# Patient Record
Sex: FEMALE | Race: WHITE | NOT HISPANIC OR LATINO | Employment: UNEMPLOYED | URBAN - METROPOLITAN AREA
[De-identification: names, ages, dates, MRNs, and addresses within clinical notes are randomized per-mention and may not be internally consistent; named-entity substitution may affect disease eponyms.]

---

## 2022-04-09 ENCOUNTER — HISTORICAL (OUTPATIENT)
Dept: ADMINISTRATIVE | Facility: HOSPITAL | Age: 52
End: 2022-04-09

## 2022-04-27 VITALS
WEIGHT: 167.44 LBS | DIASTOLIC BLOOD PRESSURE: 53 MMHG | BODY MASS INDEX: 26.91 KG/M2 | SYSTOLIC BLOOD PRESSURE: 110 MMHG | HEIGHT: 66 IN

## 2022-08-18 LAB
PAP RECOMMENDATION EXT: NORMAL
PAP SMEAR: NORMAL

## 2022-10-07 LAB — BCS RECOMMENDATION EXT: NORMAL

## 2023-01-18 ENCOUNTER — DOCUMENTATION ONLY (OUTPATIENT)
Dept: ADMINISTRATIVE | Facility: HOSPITAL | Age: 53
End: 2023-01-18

## 2024-01-09 ENCOUNTER — OFFICE VISIT (OUTPATIENT)
Dept: OBSTETRICS AND GYNECOLOGY | Facility: CLINIC | Age: 54
End: 2024-01-09
Payer: COMMERCIAL

## 2024-01-09 VITALS
WEIGHT: 172.63 LBS | DIASTOLIC BLOOD PRESSURE: 82 MMHG | HEART RATE: 64 BPM | SYSTOLIC BLOOD PRESSURE: 120 MMHG | BODY MASS INDEX: 27.74 KG/M2 | HEIGHT: 66 IN

## 2024-01-09 DIAGNOSIS — R82.90 ABNORMAL URINE ODOR: ICD-10-CM

## 2024-01-09 DIAGNOSIS — Z12.31 SCREENING MAMMOGRAM FOR BREAST CANCER: Primary | ICD-10-CM

## 2024-01-09 DIAGNOSIS — Z01.419 ENCOUNTER FOR ANNUAL ROUTINE GYNECOLOGICAL EXAMINATION: ICD-10-CM

## 2024-01-09 LAB
BILIRUB UR QL STRIP: NEGATIVE
GLUCOSE UR QL STRIP: NEGATIVE
KETONES UR QL STRIP: NEGATIVE
LEUKOCYTE ESTERASE UR QL STRIP: NEGATIVE
PH, POC UA: 7
POC BLOOD, URINE: NEGATIVE
POC NITRATES, URINE: NEGATIVE
PROT UR QL STRIP: POSITIVE
SP GR UR STRIP: 1.02 (ref 1–1.03)
UROBILINOGEN UR STRIP-ACNC: 1 (ref 0.1–1.1)

## 2024-01-09 PROCEDURE — 81003 URINALYSIS AUTO W/O SCOPE: CPT | Mod: QW,,, | Performed by: OBSTETRICS & GYNECOLOGY

## 2024-01-09 PROCEDURE — 99396 PREV VISIT EST AGE 40-64: CPT | Mod: 25,,, | Performed by: OBSTETRICS & GYNECOLOGY

## 2024-01-09 PROCEDURE — 3079F DIAST BP 80-89 MM HG: CPT | Mod: CPTII,,, | Performed by: OBSTETRICS & GYNECOLOGY

## 2024-01-09 PROCEDURE — 1159F MED LIST DOCD IN RCRD: CPT | Mod: CPTII,,, | Performed by: OBSTETRICS & GYNECOLOGY

## 2024-01-09 PROCEDURE — 3008F BODY MASS INDEX DOCD: CPT | Mod: CPTII,,, | Performed by: OBSTETRICS & GYNECOLOGY

## 2024-01-09 PROCEDURE — 1160F RVW MEDS BY RX/DR IN RCRD: CPT | Mod: CPTII,,, | Performed by: OBSTETRICS & GYNECOLOGY

## 2024-01-09 PROCEDURE — 3074F SYST BP LT 130 MM HG: CPT | Mod: CPTII,,, | Performed by: OBSTETRICS & GYNECOLOGY

## 2024-01-09 RX ORDER — DILTIAZEM HYDROCHLORIDE 180 MG/1
180 CAPSULE, COATED, EXTENDED RELEASE ORAL
COMMUNITY
Start: 2023-11-15

## 2024-01-09 RX ORDER — NEBIVOLOL 10 MG/1
10 TABLET ORAL
COMMUNITY
Start: 2023-11-15

## 2024-01-09 RX ORDER — LIFITEGRAST 50 MG/ML
SOLUTION/ DROPS OPHTHALMIC
COMMUNITY
Start: 2023-09-27

## 2024-01-09 NOTE — PROGRESS NOTES
Patient ID: [id number]   Chief Complaint: Annual exam  Chief Complaint   Patient presents with    Annual Exam     C/O STRONG ODOR TO URINE . DENIES DYSURIA.     HPI:   Pretty Kendall is a 53 y.o. year old  here for her Annual Exam.   Patient's last menstrual period was 2023 (approximate).    C/O STRONG ODOR TO URINE. DENIES DYSURIA. DENIES BACK PAIN.    Subjective:     Past Medical History:   Diagnosis Date    Abnormal Pap smear of cervix     Dry eyes     GERD (gastroesophageal reflux disease)     Mitral valve prolapse     Raynaud phenomenon      Past Surgical History:   Procedure Laterality Date    CRYO OF CERVIX      TONSILS REMOVED      WISDOM TEETH EXTRACTED       Social History     Tobacco Use    Smoking status: Never    Smokeless tobacco: Never   Substance Use Topics    Alcohol use: Yes     Comment: RARE    Drug use: Never     Family History   Problem Relation Age of Onset    Heart disease Paternal Grandfather     Heart disease Paternal Grandmother     Heart disease Maternal Grandmother     Heart disease Maternal Grandfather     Cancer Father     Heart disease Father     Heart disease Mother      OB History    Para Term  AB Living   2 2 1 1   2   SAB IAB Ectopic Multiple Live Births           2      # Outcome Date GA Lbr Christiano/2nd Weight Sex Delivery Anes PTL Lv   2  01    F Vag-Spont   COLEMAN   1 Term 97    F Vag-Spont   COLEMAN       Current Outpatient Medications:     diltiaZEM (CARDIZEM CD) 180 MG 24 hr capsule, Take 180 mg by mouth., Disp: , Rfl:     nebivoloL (BYSTOLIC) 10 MG Tab, Take 10 mg by mouth., Disp: , Rfl:     XIIDRA 5 % Dpet, , Disp: , Rfl:     MENARCHEAL:  Cycle Length: 5 days   Flow: normal  Dysmenorrhea: YES  If yes: Mild  Intermenstrual Bleeding: No  PAP:  Last PAP: 2024    History of Abnormal PAP Smear: YES  Treated: Colposcopy CRYO SURG  HPV Vaccine: NO  INTERCOURSE:  Dyspareunia: No  Postcoital Bleeding: No  History of STI: No  Current Birth Control  "Method: vasectomy  Sexually Active: yes  BREAST HISTORY:   Last Mammogram: 10-7-22  History of Abnormal Mammogram: YES: DENSITY     COLONOSCOPY:  Last Colonoscopy:3-2023 POLYP BENIGN       Review of Systems 12 point review of systems conducted, negative except as stated in the history of present illness. See HPI for details.    Objective:   Visit Vitals  /82   Pulse 64   Ht 5' 6" (1.676 m)   Wt 78.3 kg (172 lb 9.6 oz)   LMP 12/28/2023 (Approximate)   BMI 27.86 kg/m²     No results found for this or any previous visit (from the past 24 hour(s)).  Physical Exam:  Physical Exam  Constitutional:  General Appearance : alert, in no acute distress, normal, well nourished.  Respiratory:  Respiratory Effort: normal.  Breast:  Right: Inspection/palpation: no discharge, no masses present, no nipple retraction, no skin changes, no skin dimpling, no tenderness, no lymphadenopathy, no axillary mass, no axillary tenderness.  Left: Inspection/palpation: no discharge, no masses present, no nipple retraction, no skin changes, no skin dimpling, no tenderness, no lymphadenopathy, no axillary mass, no axillary tenderness.  Gastrointestinal:  Abdomen: no masses. no tender, nondistended.  Liver and spleen: normal  Hernias: no hernias present, no inguinal adenopathy.  Genitourinary:  External Genitalia: normal, no lesions.  Vagina: normal appearance, no abnormal discharge, no lesions.  Bladder: no mass, nontender.  Urethra: no erythema or lesions present.  Cervix: no lesions, non tender. Pap Done DECLINES STD TEST VIA PAP  Uterus: nontender, normal contour, normal mobility, normal size.   Adnexa: no masses, no tenderness.  Anus and Perineum: visually normal.   Chaperone Present  No results found for this or any previous visit (from the past 24 hour(s)).  Assessment/Plan:   Assessment:   Screening mammogram for breast cancer  -     Mammo Digital Screening Bilat    Encounter for annual routine gynecological examination  -     " Liquid-Based Pap Smear, Screening Screening    Abnormal urine odor  -     POCT Urinalysis, Dipstick, Automated, W/O Scope      Follow up in about 1 year (around 1/9/2025) for ANNUAL EXAM. In addition to their scheduled FU, the patient has also been instructed to follow up on as needed basis. All questions were answered and the patient voiced understanding of the above issues.

## 2024-01-12 LAB — PSYCHE PATHOLOGY RESULT: NORMAL

## 2024-02-14 DIAGNOSIS — R92.2 INCONCLUSIVE MAMMOGRAM: Primary | ICD-10-CM

## 2024-03-07 ENCOUNTER — DOCUMENTATION ONLY (OUTPATIENT)
Dept: OBSTETRICS AND GYNECOLOGY | Facility: CLINIC | Age: 54
End: 2024-03-07
Payer: COMMERCIAL

## 2024-09-04 ENCOUNTER — OFFICE VISIT (OUTPATIENT)
Dept: OBSTETRICS AND GYNECOLOGY | Facility: CLINIC | Age: 54
End: 2024-09-04
Payer: COMMERCIAL

## 2024-09-04 VITALS
DIASTOLIC BLOOD PRESSURE: 74 MMHG | BODY MASS INDEX: 28.34 KG/M2 | WEIGHT: 175.63 LBS | SYSTOLIC BLOOD PRESSURE: 112 MMHG | HEART RATE: 64 BPM

## 2024-09-04 DIAGNOSIS — R55 VASOMOTOR INSTABILITY: ICD-10-CM

## 2024-09-04 DIAGNOSIS — N92.6 IRREGULAR BLEEDING: Primary | ICD-10-CM

## 2024-09-04 LAB
ERYTHROCYTE [DISTWIDTH] IN BLOOD BY AUTOMATED COUNT: 12.8 % (ref 11–14.5)
HCT VFR BLD AUTO: 42.5 % (ref 36–48)
HGB BLD-MCNC: 14.2 G/DL (ref 11.8–16)
MCH RBC QN AUTO: 30.7 PG (ref 27–34)
MCHC RBC AUTO-ENTMCNC: 33.4 G/DL (ref 31–37)
MCV RBC AUTO: 92 FL (ref 79–99)
NRBC BLD AUTO-RTO: 0 %
PLATELET # BLD AUTO: 453 X10(3)/MCL (ref 140–371)
PMV BLD AUTO: 10.3 FL (ref 9.4–12.4)
RBC # BLD AUTO: 4.62 X10(6)/MCL (ref 4–5.1)
T4 FREE SERPL-MCNC: 0.99 NG/DL (ref 0.78–2.19)
TSH SERPL-ACNC: 3.22 UIU/ML (ref 0.36–3.74)
WBC # BLD AUTO: 10.23 X10(3)/MCL (ref 4–11.5)

## 2024-09-04 PROCEDURE — 1159F MED LIST DOCD IN RCRD: CPT | Mod: CPTII,,, | Performed by: OBSTETRICS & GYNECOLOGY

## 2024-09-04 PROCEDURE — 82670 ASSAY OF TOTAL ESTRADIOL: CPT | Performed by: OBSTETRICS & GYNECOLOGY

## 2024-09-04 PROCEDURE — 3078F DIAST BP <80 MM HG: CPT | Mod: CPTII,,, | Performed by: OBSTETRICS & GYNECOLOGY

## 2024-09-04 PROCEDURE — 84443 ASSAY THYROID STIM HORMONE: CPT | Performed by: OBSTETRICS & GYNECOLOGY

## 2024-09-04 PROCEDURE — 84439 ASSAY OF FREE THYROXINE: CPT | Performed by: OBSTETRICS & GYNECOLOGY

## 2024-09-04 PROCEDURE — 3074F SYST BP LT 130 MM HG: CPT | Mod: CPTII,,, | Performed by: OBSTETRICS & GYNECOLOGY

## 2024-09-04 PROCEDURE — 3008F BODY MASS INDEX DOCD: CPT | Mod: CPTII,,, | Performed by: OBSTETRICS & GYNECOLOGY

## 2024-09-04 PROCEDURE — 85027 COMPLETE CBC AUTOMATED: CPT | Performed by: OBSTETRICS & GYNECOLOGY

## 2024-09-04 PROCEDURE — 1160F RVW MEDS BY RX/DR IN RCRD: CPT | Mod: CPTII,,, | Performed by: OBSTETRICS & GYNECOLOGY

## 2024-09-04 PROCEDURE — 83001 ASSAY OF GONADOTROPIN (FSH): CPT | Performed by: OBSTETRICS & GYNECOLOGY

## 2024-09-04 PROCEDURE — 99213 OFFICE O/P EST LOW 20 MIN: CPT | Mod: ,,, | Performed by: OBSTETRICS & GYNECOLOGY

## 2024-09-04 NOTE — PROGRESS NOTES
Patient ID: [id number]   Chief Complaint: ABNORMAL BLEEDING AND SPOTTING.   HPI:   Pretty Kendall is a 54 y.o.  here today for PROBLEMS, PRESENTS TO CLINIC FOR ABNORMAL BLEEDING AND SPOTTING. PATIENT STATES SHE HAD A FLOW FOR X 2 WEEKS, AND HAS NOW BEEN SPOTTING THIS WEEK. PATIENT DENIES PELVIC PAIN. DENIES VAGINAL DISCHARGE AND ODOR.    Patient's last menstrual period was 2024 (approximate).  Past Medical History:  has a past medical history of Abnormal Pap smear of cervix, Dry eyes, GERD (gastroesophageal reflux disease), Mitral valve prolapse, and Raynaud phenomenon.  Surgical History:  has a past surgical history that includes TONSILS REMOVED; WISDOM TEETH EXTRACTED; and CRYO OF CERVIX.  Family History: family history includes Cancer in her father; Heart disease in her father, maternal grandfather, maternal grandmother, mother, paternal grandfather, and paternal grandmother.  Social History:  reports that she has never smoked. She has never used smokeless tobacco. She reports current alcohol use. She reports that she does not use drugs.  Current Outpatient Medications   Medication Sig Dispense Refill    diltiaZEM (CARDIZEM CD) 180 MG 24 hr capsule Take 180 mg by mouth.      nebivoloL (BYSTOLIC) 10 MG Tab Take 10 mg by mouth.      XIIDRA 5 % Dpet  (Patient not taking: Reported on 2024)       No current facility-administered medications for this visit.     Patient has No Known Allergies.  MENARCHEAL:  Cycle Length: 5 days   Flow: normal  Dysmenorrhea: Yes  If yes: Mild  Intermenstrual Bleeding: Yes  PAP:  Last PAP: 2024    History of Abnormal PAP Smear: YES:      Treated: Other: CRYO OF CERVIX  HPV Vaccine: NO  INTERCOURSE:  Dyspareunia: No  Postcoital Bleeding: No  History of STI: No  Current Birth Control Method: PARTNER VASECTOMY  Sexually Active: yes  BREAST HISTORY:   Last Mammogram: 2024- US FOR CYST, NORMAL  History of Abnormal Mammogram: YES:      MENOPAUSE:  Post Menopausal  Bleeding: No  Hormone Replacement Therapy: No  COLONOSCOPY:  Last Colonoscopy:  JUNE 2023- POLYPS     No results found for this or any previous visit (from the past 24 hour(s)).    Subjective:   Review of Systems  12 point review of systems conducted, negative except as stated in the history of present illness. See HPI for details.  Objective:     Visit Vitals  /74   Pulse 64   Wt 79.7 kg (175 lb 9.6 oz)   LMP 08/06/2024 (Approximate)   BMI 28.34 kg/m²     No results found for this or any previous visit (from the past 24 hour(s)).  Physical Exam  Constitutional:  General Appearance : alert, in no acute distress, normal, well nourished.  Neck/Thyroid:  Inspection/Palpation: normal. Thyroid: normal size and shape.  Respiratory:  Respiratory Effort: normal.  Gastrointestinal:  Abdomen: no masses. no tender, nondistended.  Liver and spleen: normal  Hernias: no hernias present, no inguinal adenopathy.  Genitourinary:  External Genitalia: normal, no lesions.  Vagina: normal appearance, no abnormal discharge, no lesions.  Bladder: no mass, nontender.  Urethra: no erythema or lesions present.  Cervix: no lesions, non tender. ONE SWAB COLLECTED  Uterus: nontender, normal contour, normal mobility, normal size.   Adnexa: no masses, no tenderness.  Anus and Perineum: visually normal.   Chaperone Present  Assessment:       ICD-10-CM ICD-9-CM   1. Irregular bleeding  N92.6 626.4   2. Vasomotor instability  R55 780.2     Plan   Irregular bleeding  -     MDL Sendout Test  -     US Pelvis Complete Non OB; Future; Expected date: 09/04/2024  -     CBC Without Differential; Future; Expected date: 09/04/2024  -     TSH; Future; Expected date: 09/04/2024  -     T4, Free; Future; Expected date: 09/04/2024  -     Estradiol; Future; Expected date: 09/04/2024  -     Follicle Stimulating Hormone; Future; Expected date: 09/04/2024    Vasomotor instability    Follow up in about 2 weeks (around 9/18/2024) for RESULTS/EMB. In addition to  their scheduled follow up, the patient has also been instructed to follow up on as needed basis.     MELIA KNIGHT MD

## 2024-09-05 LAB
ESTRADIOL SERPL HS-MCNC: 242 PG/ML
FSH SERPL-ACNC: 4.44 MIU/ML

## 2024-09-18 ENCOUNTER — OFFICE VISIT (OUTPATIENT)
Dept: OBSTETRICS AND GYNECOLOGY | Facility: CLINIC | Age: 54
End: 2024-09-18
Payer: COMMERCIAL

## 2024-09-18 VITALS
WEIGHT: 177 LBS | BODY MASS INDEX: 28.57 KG/M2 | HEART RATE: 61 BPM | SYSTOLIC BLOOD PRESSURE: 90 MMHG | DIASTOLIC BLOOD PRESSURE: 60 MMHG

## 2024-09-18 DIAGNOSIS — N92.6 IRREGULAR BLEEDING: Primary | ICD-10-CM

## 2024-09-18 DIAGNOSIS — R55 VASOMOTOR INSTABILITY: ICD-10-CM

## 2024-09-18 DIAGNOSIS — D25.9 UTERINE LEIOMYOMA, UNSPECIFIED LOCATION: ICD-10-CM

## 2024-09-18 RX ORDER — CELECOXIB 200 MG/1
1 CAPSULE ORAL 2 TIMES DAILY PRN
COMMUNITY
End: 2024-09-18

## 2024-09-18 NOTE — PROGRESS NOTES
"Patient ID: [id number]   Chief Complaint: pelvic ultrasound, LAB results, POSSIBLE EMB    HPI:   Pretty Kendall is a 54 y.o.  here today for Results (Presents to clinic for pelvic ultrasound results and labs. Per dr. Brantley, possible EMB. Patient states she did premedicate for visit, and complains of bleeding today. States she has been passing lots of clots and "bleeding through everything I put on" patient states of 6th week bleeding and spotting.)    U/S: 8.9CM, ES 25MM WITH CYSTS, RIGHT OV 3CM WITH 2CM CYST, LEFT OV NL, NO FREE FLUID, SINGLE ANTERIOR SUBSEROSAL FIBROID  CBC AND THYROID STUDIES NORMAL.  FSH/ESTRADIOL ARE NORMAL     Patient's last menstrual period was 2024 (approximate).  Past Medical History:  has a past medical history of Abnormal Pap smear of cervix, Dry eyes, GERD (gastroesophageal reflux disease), Mitral valve prolapse, and Raynaud phenomenon.  Surgical History:  has a past surgical history that includes TONSILS REMOVED; WISDOM TEETH EXTRACTED; and CRYO OF CERVIX.  Family History: family history includes Cancer in her father; Heart disease in her father, maternal grandfather, maternal grandmother, mother, paternal grandfather, and paternal grandmother.  Social History:  reports that she has never smoked. She has never used smokeless tobacco. She reports current alcohol use. She reports that she does not use drugs.  Current Outpatient Medications   Medication Sig Dispense Refill    diltiaZEM (CARDIZEM CD) 180 MG 24 hr capsule Take 180 mg by mouth.      nebivoloL (BYSTOLIC) 10 MG Tab Take 10 mg by mouth.      norethindrone (AYGESTIN) 5 mg Tab Take 1 tablet (5 mg total) by mouth once daily. 30 tablet 11     No current facility-administered medications for this visit.     Patient has No Known Allergies.  MENARCHEAL:  Cycle Length: 5 days   Flow: normal  Dysmenorrhea: Yes  If yes: Mild  Intermenstrual Bleeding: Yes  PAP:  Last PAP: 2024    History of Abnormal PAP Smear: YES:    "   Treated: cryo of cervix  HPV Vaccine: NO  INTERCOURSE:  Dyspareunia: No  Postcoital Bleeding: No  History of STI: No  Current Birth Control Method: none  Sexually Active: yes  BREAST HISTORY:   Last Mammogram:  JANUARY 2024- US FOR CYST, NORMAL   History of Abnormal Mammogram: NO  MENOPAUSE:  Post Menopausal Bleeding: No  Hormone Replacement Therapy: No  COLONOSCOPY:  Last Colonoscopy: JUNE 2023- POLYPS          No results found for this or any previous visit (from the past 24 hours).    Subjective:   Review of Systems  12 point review of systems conducted, negative except as stated in the history of present illness. See HPI for details.  Objective:     Visit Vitals  BP 90/60   Pulse 61   Wt 80.3 kg (177 lb)   LMP 08/06/2024 (Approximate)   BMI 28.57 kg/m²     No results found for this or any previous visit (from the past 24 hours).  Physical Exam  Constitutional:  General Appearance : alert, in no acute distress, normal, well nourished.  Neck/Thyroid:  Inspection/Palpation: normal.  Respiratory:  Respiratory Effort: normal.  Gastrointestinal:  Abdomen: no masses. no tender, nondistended.  Liver and spleen: normal  Hernias: no hernias present, no inguinal adenopathy.  Genitourinary:  External Genitalia: normal, no lesions.  Vagina: normal appearance, no abnormal discharge, no lesions.  Bladder: no mass, nontender.  Urethra: no erythema or lesions present.  Cervix: no lesions, non tender. (SEE EMB PROCEDURE NOTE)  Uterus: nontender, normal contour, normal mobility, normal size.   Adnexa: no masses, no tenderness.  Anus and Perineum: visually normal.   Chaperone Present  Assessment:       ICD-10-CM ICD-9-CM   1. Irregular bleeding  N92.6 626.4   2. Vasomotor instability  R55 780.2   3. Uterine leiomyoma, unspecified location  D25.9 218.9     Plan   Irregular bleeding  -     Specimen to Pathology Gynecology and Obstetrics    Vasomotor instability    Uterine leiomyoma, unspecified location    Follow up in about 2  weeks (around 10/2/2024) for RESULTS. In addition to their scheduled follow up, the patient has also been instructed to follow up on as needed basis.     MELIA KNIGTH MD

## 2024-09-20 LAB — PSYCHE PATHOLOGY RESULT: NORMAL

## 2024-09-25 ENCOUNTER — OFFICE VISIT (OUTPATIENT)
Dept: OBSTETRICS AND GYNECOLOGY | Facility: CLINIC | Age: 54
End: 2024-09-25
Payer: COMMERCIAL

## 2024-09-25 VITALS
SYSTOLIC BLOOD PRESSURE: 108 MMHG | BODY MASS INDEX: 28.41 KG/M2 | DIASTOLIC BLOOD PRESSURE: 60 MMHG | HEART RATE: 86 BPM | WEIGHT: 176 LBS

## 2024-09-25 DIAGNOSIS — R93.89 THICKENED ENDOMETRIUM: ICD-10-CM

## 2024-09-25 DIAGNOSIS — N92.3 INTERMENSTRUAL BLEEDING: ICD-10-CM

## 2024-09-25 DIAGNOSIS — N84.0 ABNORMAL UTERINE BLEEDING DUE TO ENDOMETRIAL POLYP: Primary | ICD-10-CM

## 2024-09-25 DIAGNOSIS — N92.4 EXCESSIVE BLEEDING IN PREMENOPAUSAL PERIOD: ICD-10-CM

## 2024-09-25 DIAGNOSIS — N93.9 ABNORMAL UTERINE BLEEDING DUE TO ENDOMETRIAL POLYP: Primary | ICD-10-CM

## 2024-09-25 PROCEDURE — 3074F SYST BP LT 130 MM HG: CPT | Mod: CPTII,,, | Performed by: OBSTETRICS & GYNECOLOGY

## 2024-09-25 PROCEDURE — 99213 OFFICE O/P EST LOW 20 MIN: CPT | Mod: ,,, | Performed by: OBSTETRICS & GYNECOLOGY

## 2024-09-25 PROCEDURE — 1159F MED LIST DOCD IN RCRD: CPT | Mod: CPTII,,, | Performed by: OBSTETRICS & GYNECOLOGY

## 2024-09-25 PROCEDURE — 1160F RVW MEDS BY RX/DR IN RCRD: CPT | Mod: CPTII,,, | Performed by: OBSTETRICS & GYNECOLOGY

## 2024-09-25 PROCEDURE — 3008F BODY MASS INDEX DOCD: CPT | Mod: CPTII,,, | Performed by: OBSTETRICS & GYNECOLOGY

## 2024-09-25 PROCEDURE — 3078F DIAST BP <80 MM HG: CPT | Mod: CPTII,,, | Performed by: OBSTETRICS & GYNECOLOGY

## 2024-09-25 NOTE — PROGRESS NOTES
Patient ID: [id number]   Chief Complaint: EMB RESULTS (PT STATED STILL BLEEDING.)    HPI:   Pretty Kendall is a 54 y.o.  here today for EMB RESULTS. PT STATED STILL BLEEDING TODAY.  PATH: ENDOMETRIAL BIOPSY:   FRAGMENTS OF BENIGN DYSSYNCHRONOUS ENDOMETRIUM WITH FOCAL FEATURES SUGGESTIVE OF   ENDOMETRIAL POLYP.   DISCUSSED HYSTEROSCOPY TO BETTER EVALUATE THE LINING OF THE UTERUS, GIVEN THE U/S RESULTS SHOWING POSSIBLE HYPERPLASIA.      Patient's last menstrual period was 2024 (approximate).  Past Medical History:  has a past medical history of Abnormal Pap smear of cervix, Dry eyes, GERD (gastroesophageal reflux disease), Mitral valve prolapse, and Raynaud phenomenon.  Surgical History:  has a past surgical history that includes TONSILS REMOVED; WISDOM TEETH EXTRACTED; and CRYO OF CERVIX.  Family History: family history includes Cancer in her father; Heart disease in her father, maternal grandfather, maternal grandmother, mother, paternal grandfather, and paternal grandmother.  Social History:  reports that she has never smoked. She has never used smokeless tobacco. She reports current alcohol use. She reports that she does not use drugs.  Current Outpatient Medications   Medication Sig Dispense Refill    diltiaZEM (CARDIZEM CD) 180 MG 24 hr capsule Take 180 mg by mouth.      nebivoloL (BYSTOLIC) 10 MG Tab Take 10 mg by mouth.      norethindrone (AYGESTIN) 5 mg Tab Take 1 tablet (5 mg total) by mouth once daily. 30 tablet 11     No current facility-administered medications for this visit.     Patient has No Known Allergies.  MENARCHEAL:  Cycle Length: 5-7 days   Flow: HEAVY  Dysmenorrhea: Yes  If yes: Mild  Intermenstrual Bleeding: Yes  PAP:  Last PAP: 2024    History of Abnormal PAP Smear: YES:   Treated: CRYO OF CERVIX  HPV Vaccine: NO  INTERCOURSE:  Dyspareunia: No  Postcoital Bleeding: No  History of STI: No   If yes, then: No   Current Birth Control Method: vasectomy  Sexually Active: yes  BREAST  HISTORY:   Last Mammogram: 01/2024 US FOR CYST NORMAL  History of Abnormal Mammogram: NO  MENOPAUSE:  Post Menopausal Bleeding: No  Hormone Replacement Therapy: No  COLONOSCOPY:  Last Colonoscopy:   JUNE 2023 POLYPS    No results found for this or any previous visit (from the past 24 hours).    Subjective:   Review of Systems  12 point review of systems conducted, negative except as stated in the history of present illness. See HPI for details.  Objective:     Visit Vitals  /60   Pulse 86   Wt 79.8 kg (176 lb)   LMP 08/06/2024 (Approximate)   BMI 28.41 kg/m²     No results found for this or any previous visit (from the past 24 hours).  Physical Exam  Constitutional:  General Appearance : alert, in no acute distress, normal, well nourished.  Neck/Thyroid:  Inspection/Palpation: normal.  Respiratory:  Respiratory Effort: normal.  Breast:  NO BREAST EXAM TODAY  Gastrointestinal:  Abdomen: no masses. no tender, nondistended.  Liver and spleen: normal  Hernias: no hernias present, no inguinal adenopathy.  Genitourinary:  NO PELVIC EXAM TODAY    Chaperone Present  Assessment:       ICD-10-CM ICD-9-CM   1. Abnormal uterine bleeding due to endometrial polyp  N93.9 626.6    N84.0 621.0   2. Intermenstrual bleeding  N92.3 626.6   3. Excessive bleeding in premenopausal period  N92.4 627.0   4. Thickened endometrium  R93.89 793.5     Plan   Abnormal uterine bleeding due to endometrial polyp  -     norethindrone (AYGESTIN) 5 mg Tab; Take 1 tablet (5 mg total) by mouth once daily.  Dispense: 30 tablet; Refill: 11    Intermenstrual bleeding  -     norethindrone (AYGESTIN) 5 mg Tab; Take 1 tablet (5 mg total) by mouth once daily.  Dispense: 30 tablet; Refill: 11    Excessive bleeding in premenopausal period    Thickened endometrium    Follow up in about 15 days (around 10/10/2024) for PRE-OP FOR HYSTO/D&C/POLYPECTOMY. In addition to their scheduled follow up, the patient has also been instructed to follow up on as needed  basis.     MELIA KNIGHT MD

## 2024-09-26 RX ORDER — NORETHINDRONE 5 MG/1
5 TABLET ORAL DAILY
Qty: 30 TABLET | Refills: 11 | Status: SHIPPED | OUTPATIENT
Start: 2024-09-26 | End: 2025-09-26

## 2024-09-27 DIAGNOSIS — N93.9 ABNORMAL UTERINE BLEEDING DUE TO ENDOMETRIAL POLYP: Primary | ICD-10-CM

## 2024-09-27 DIAGNOSIS — N84.0 ABNORMAL UTERINE BLEEDING DUE TO ENDOMETRIAL POLYP: Primary | ICD-10-CM

## 2024-09-27 DIAGNOSIS — N92.6 IRREGULAR BLEEDING: ICD-10-CM

## 2024-10-03 NOTE — PROCEDURES
Endometrial biopsy    Date/Time: 9/18/2024 10:15 AM    Performed by: Marlon Brantley MD  Authorized by: Marlon Brantley MD    Consent:     Consent obtained:  Prior to procedure the appropriate consent was completed and verified    Consent given by:  Patient    Patient questions answered: yes      Patient agrees, verbalizes understanding, and wants to proceed: yes    Indication:     Indications: Other disorder of menstruation and other abnormal bleeding from female genital tract    Pre-procedure:     Pre-procedure timeout performed: yes    Procedure:     Procedure: endometrial biopsy with Pipelle      Cervix cleaned and prepped: yes      Uterus sounded: yes      Uterus sound depth (cm):  6    Specimen collected: specimen collected and sent to pathology      Patient tolerated procedure well with no complications: yes

## 2024-10-17 ENCOUNTER — OFFICE VISIT (OUTPATIENT)
Dept: OBSTETRICS AND GYNECOLOGY | Facility: CLINIC | Age: 54
End: 2024-10-17
Payer: COMMERCIAL

## 2024-10-17 ENCOUNTER — HOSPITAL ENCOUNTER (OUTPATIENT)
Dept: PREADMISSION TESTING | Facility: HOSPITAL | Age: 54
Discharge: HOME | End: 2024-10-17
Attending: OBSTETRICS & GYNECOLOGY
Payer: COMMERCIAL

## 2024-10-17 VITALS — HEIGHT: 66 IN | BODY MASS INDEX: 28.28 KG/M2 | WEIGHT: 176 LBS

## 2024-10-17 VITALS
HEART RATE: 86 BPM | SYSTOLIC BLOOD PRESSURE: 120 MMHG | DIASTOLIC BLOOD PRESSURE: 60 MMHG | WEIGHT: 176 LBS | BODY MASS INDEX: 28.41 KG/M2

## 2024-10-17 DIAGNOSIS — N84.0 ABNORMAL UTERINE BLEEDING DUE TO ENDOMETRIAL POLYP: ICD-10-CM

## 2024-10-17 DIAGNOSIS — N92.4 EXCESSIVE BLEEDING IN PREMENOPAUSAL PERIOD: ICD-10-CM

## 2024-10-17 DIAGNOSIS — R93.89 THICKENED ENDOMETRIUM: ICD-10-CM

## 2024-10-17 DIAGNOSIS — N92.3 INTERMENSTRUAL BLEEDING: ICD-10-CM

## 2024-10-17 DIAGNOSIS — N92.4 EXCESSIVE BLEEDING IN PREMENOPAUSAL PERIOD: Primary | ICD-10-CM

## 2024-10-17 DIAGNOSIS — N93.9 ABNORMAL UTERINE BLEEDING DUE TO ENDOMETRIAL POLYP: ICD-10-CM

## 2024-10-17 DIAGNOSIS — D25.9 UTERINE LEIOMYOMA, UNSPECIFIED LOCATION: ICD-10-CM

## 2024-10-17 LAB
ALBUMIN SERPL-MCNC: 4.8 G/DL (ref 3.4–5)
ALBUMIN/GLOB SERPL: 1.7 RATIO
ALP SERPL-CCNC: 59 UNIT/L (ref 50–144)
ALT SERPL-CCNC: 28 UNIT/L (ref 1–45)
ANION GAP SERPL CALC-SCNC: 11 MEQ/L (ref 2–13)
APTT PPP: 25.6 SECONDS (ref 23–29.4)
AST SERPL-CCNC: 29 UNIT/L (ref 14–36)
BACTERIA #/AREA URNS AUTO: NORMAL /HPF
BASOPHILS # BLD AUTO: 0.1 X10(3)/MCL (ref 0.01–0.08)
BASOPHILS NFR BLD AUTO: 1 % (ref 0.1–1.2)
BILIRUB SERPL-MCNC: 0.6 MG/DL (ref 0–1)
BILIRUB UR QL STRIP.AUTO: NEGATIVE
BUN SERPL-MCNC: 17 MG/DL (ref 7–20)
CALCIUM SERPL-MCNC: 10.1 MG/DL (ref 8.4–10.2)
CHLORIDE SERPL-SCNC: 104 MMOL/L (ref 98–110)
CLARITY UR: CLEAR
CO2 SERPL-SCNC: 25 MMOL/L (ref 21–32)
COLOR UR AUTO: YELLOW
CREAT SERPL-MCNC: 0.76 MG/DL (ref 0.66–1.25)
CREAT/UREA NIT SERPL: 22 (ref 12–20)
EOSINOPHIL # BLD AUTO: 0.18 X10(3)/MCL (ref 0.04–0.36)
EOSINOPHIL NFR BLD AUTO: 1.8 % (ref 0.7–7)
ERYTHROCYTE [DISTWIDTH] IN BLOOD BY AUTOMATED COUNT: 12.8 % (ref 11–14.5)
GFR SERPLBLD CREATININE-BSD FMLA CKD-EPI: >90 ML/MIN/1.73/M2
GLOBULIN SER-MCNC: 2.9 GM/DL (ref 2–3.9)
GLUCOSE SERPL-MCNC: 110 MG/DL (ref 70–115)
GLUCOSE UR QL STRIP: NEGATIVE
HCT VFR BLD AUTO: 43.6 % (ref 36–48)
HGB BLD-MCNC: 14.5 G/DL (ref 11.8–16)
HGB UR QL STRIP: ABNORMAL
IMM GRANULOCYTES # BLD AUTO: 0.03 X10(3)/MCL (ref 0–0.03)
IMM GRANULOCYTES NFR BLD AUTO: 0.3 % (ref 0–0.5)
INR PPP: 1
KETONES UR QL STRIP: NEGATIVE
LEUKOCYTE ESTERASE UR QL STRIP: NEGATIVE
LYMPHOCYTES # BLD AUTO: 2.75 X10(3)/MCL (ref 1.16–3.74)
LYMPHOCYTES NFR BLD AUTO: 28 % (ref 20–55)
MCH RBC QN AUTO: 31.1 PG (ref 27–34)
MCHC RBC AUTO-ENTMCNC: 33.3 G/DL (ref 31–37)
MCV RBC AUTO: 93.6 FL (ref 79–99)
MONOCYTES # BLD AUTO: 0.84 X10(3)/MCL (ref 0.24–0.36)
MONOCYTES NFR BLD AUTO: 8.5 % (ref 4.7–12.5)
NEUTROPHILS # BLD AUTO: 5.93 X10(3)/MCL (ref 1.56–6.13)
NEUTROPHILS NFR BLD AUTO: 60.4 % (ref 37–73)
NITRITE UR QL STRIP: NEGATIVE
NRBC BLD AUTO-RTO: 0 %
PH UR STRIP: 6 [PH]
PLATELET # BLD AUTO: 380 X10(3)/MCL (ref 140–371)
PMV BLD AUTO: 9.7 FL (ref 9.4–12.4)
POTASSIUM SERPL-SCNC: 4.1 MMOL/L (ref 3.5–5.1)
PROT SERPL-MCNC: 7.7 GM/DL (ref 6.3–8.2)
PROT UR QL STRIP: NEGATIVE
PROTHROMBIN TIME: 9.7 SECONDS (ref 9.3–11.9)
RBC # BLD AUTO: 4.66 X10(6)/MCL (ref 4–5.1)
RBC #/AREA URNS AUTO: NORMAL /HPF
SODIUM SERPL-SCNC: 140 MMOL/L (ref 136–145)
SP GR UR STRIP.AUTO: 1.02 (ref 1–1.03)
SQUAMOUS #/AREA URNS AUTO: NORMAL /HPF
UROBILINOGEN UR STRIP-ACNC: 0.2
WBC # BLD AUTO: 9.83 X10(3)/MCL (ref 4–11.5)
WBC #/AREA URNS AUTO: NORMAL /HPF

## 2024-10-17 PROCEDURE — 85025 COMPLETE CBC W/AUTO DIFF WBC: CPT | Performed by: OBSTETRICS & GYNECOLOGY

## 2024-10-17 PROCEDURE — 80053 COMPREHEN METABOLIC PANEL: CPT | Performed by: OBSTETRICS & GYNECOLOGY

## 2024-10-17 PROCEDURE — 3078F DIAST BP <80 MM HG: CPT | Mod: CPTII,,, | Performed by: OBSTETRICS & GYNECOLOGY

## 2024-10-17 PROCEDURE — 85610 PROTHROMBIN TIME: CPT | Performed by: OBSTETRICS & GYNECOLOGY

## 2024-10-17 PROCEDURE — 1159F MED LIST DOCD IN RCRD: CPT | Mod: CPTII,,, | Performed by: OBSTETRICS & GYNECOLOGY

## 2024-10-17 PROCEDURE — 85730 THROMBOPLASTIN TIME PARTIAL: CPT | Performed by: OBSTETRICS & GYNECOLOGY

## 2024-10-17 PROCEDURE — 1160F RVW MEDS BY RX/DR IN RCRD: CPT | Mod: CPTII,,, | Performed by: OBSTETRICS & GYNECOLOGY

## 2024-10-17 PROCEDURE — 3074F SYST BP LT 130 MM HG: CPT | Mod: CPTII,,, | Performed by: OBSTETRICS & GYNECOLOGY

## 2024-10-17 PROCEDURE — 99213 OFFICE O/P EST LOW 20 MIN: CPT | Mod: ,,, | Performed by: OBSTETRICS & GYNECOLOGY

## 2024-10-17 PROCEDURE — 81003 URINALYSIS AUTO W/O SCOPE: CPT | Performed by: OBSTETRICS & GYNECOLOGY

## 2024-10-17 PROCEDURE — 81015 MICROSCOPIC EXAM OF URINE: CPT | Performed by: OBSTETRICS & GYNECOLOGY

## 2024-10-17 PROCEDURE — 3008F BODY MASS INDEX DOCD: CPT | Mod: CPTII,,, | Performed by: OBSTETRICS & GYNECOLOGY

## 2024-10-17 RX ORDER — MUPIROCIN 20 MG/G
OINTMENT TOPICAL
OUTPATIENT
Start: 2024-10-17

## 2024-10-17 RX ORDER — SODIUM CHLORIDE, SODIUM LACTATE, POTASSIUM CHLORIDE, CALCIUM CHLORIDE 600; 310; 30; 20 MG/100ML; MG/100ML; MG/100ML; MG/100ML
INJECTION, SOLUTION INTRAVENOUS CONTINUOUS
OUTPATIENT
Start: 2024-10-17

## 2024-10-17 RX ORDER — FAMOTIDINE 20 MG/1
20 TABLET, FILM COATED ORAL
OUTPATIENT
Start: 2024-10-17

## 2024-10-17 RX ORDER — CEFAZOLIN SODIUM 2 G/50ML
2 SOLUTION INTRAVENOUS
OUTPATIENT
Start: 2024-10-17

## 2024-10-17 NOTE — PROGRESS NOTES
Patient ID: [id number]   Chief Complaint: PRE-OP  Chief Complaint   Patient presents with    PRE-OP FOR HYSTO/D&C     NO C/O'S.     HPI:   Pretty Kendall is a 54 y.o. year old  who presents to clinic today for pre-op hysteroscopy, D&C. Procedure discussed in detail as well as risks, benefits, and alternatives. Questions answered and consents signed. Instructed for NPO after midnight. Bowel prep No.   NO C/O'S.    ENDOMETRIAL BIOPSY:   FRAGMENTS OF BENIGN DYSSYNCHRONOUS ENDOMETRIUM WITH FOCAL FEATURES SUGGESTIVE OF   ENDOMETRIAL POLYP.   DISCUSSED HYSTEROSCOPY TO BETTER EVALUATE THE LINING OF THE UTERUS, GIVEN THE U/S RESULTS SHOWING POSSIBLE HYPERPLASIA    U/S: 8.9cm, 1.8cm fibroid, ES 25mm, heterogenous with mutiple cysts suggestive of endometrial hyperplasia, Right ovarian cyst 2.9 cm, left ovary normal.     Subjective:     Past Medical History:   Diagnosis Date    Abnormal Pap smear of cervix     Dry eyes     GERD (gastroesophageal reflux disease)     Mitral valve prolapse     Raynaud phenomenon      Past Surgical History:   Procedure Laterality Date    CRYO OF CERVIX      TONSILS REMOVED      WISDOM TEETH EXTRACTED       Social History     Tobacco Use    Smoking status: Never    Smokeless tobacco: Never   Substance Use Topics    Alcohol use: Yes     Comment: RARE    Drug use: Never     Family History   Problem Relation Name Age of Onset    Heart disease Paternal Grandfather      Heart disease Paternal Grandmother      Heart disease Maternal Grandmother      Heart disease Maternal Grandfather      Cancer Father      Heart disease Father      Heart disease Mother       OB History    Para Term  AB Living   2 2 1 1   2   SAB IAB Ectopic Multiple Live Births           2      # Outcome Date GA Lbr Christiano/2nd Weight Sex Type Anes PTL Lv   2  01    F Vag-Spont   COLEMAN   1 Term 97    F Vag-Spont   COLEMAN       Current Outpatient Medications:     diltiaZEM (CARDIZEM CD) 180 MG 24 hr capsule,  Take 180 mg by mouth., Disp: , Rfl:     nebivoloL (BYSTOLIC) 10 MG Tab, Take 10 mg by mouth., Disp: , Rfl:     norethindrone (AYGESTIN) 5 mg Tab, Take 1 tablet (5 mg total) by mouth once daily., Disp: 30 tablet, Rfl: 11    MENARCHEAL:  Cycle Length: 5-7 DAYS  Flow: heavy  Dysmenorrhea: Yes  If yes: Mild  Intermenstrual Bleeding: No  PAP:  Last PAP: 1/12/2024    History of Abnormal PAP Smear: YES:   Treated: CRYO OF CERVIX  HPV Vaccine: NO  INTERCOURSE:  Dyspareunia: No  Postcoital Bleeding: No  History of STI: No   If yes, then: No   Current Birth Control Method: vasectomy  Sexually Active: yes  BREAST HISTORY:   Last Mammogram: 01/2024 US FOR CYST NORMAL  History of Abnormal Mammogram: NO  MENOPAUSE:  Post Menopausal Bleeding: No  Hormone Replacement Therapy: No  COLONOSCOPY:  Last Colonoscopy:    JUNE 2023 POLYPS      Review of Systems 12 point review of systems conducted, negative except as stated in the history of present illness. See HPI for details.  Objective:   Visit Vitals  /60   Pulse 86   Wt 79.8 kg (176 lb)   LMP 09/20/2024   BMI 28.41 kg/m²     Physical Exam:  Physical Exam  Constitutional:  General Appearance : alert, in no acute distress, normal, well nourished.  Respiratory:  Respiratory Effort: normal.  Breast:  NO BREAST EXAM TODAY  Gastrointestinal:  Abdomen: no masses. no tender, nondistended.  Liver and spleen: normal  Hernias: no hernias present, no inguinal adenopathy.  Genitourinary:  External Genitalia: normal, no lesions.  Vagina: normal appearance, no abnormal discharge, no lesions.  Bladder: no mass, nontender.  Urethra: no erythema or lesions present.  Cervix: no lesions, non tender.  Uterus: nontender, normal contour, normal mobility, normal size.   Adnexa: no masses, no tenderness.  Anus and Perineum: visually normal.   Chaperone Present    No results found for this or any previous visit (from the past 24 hours).  Assessment/Plan:   Assessment:   Excessive bleeding in  premenopausal period    Abnormal uterine bleeding due to endometrial polyp    Thickened endometrium    Intermenstrual bleeding    Uterine leiomyoma, unspecified location    For HYSTO/D&C AT [facility] ON 10/21/24.     Follow up in about 2 weeks (around 10/31/2024) for POST-OP.   In addition to their scheduled FU, the patient has also been instructed to follow up on as needed basis  All questions were answered and the patient voiced understanding of the above issues.                .

## 2024-10-17 NOTE — DISCHARGE INSTRUCTIONS

## 2024-10-18 ENCOUNTER — ANESTHESIA EVENT (OUTPATIENT)
Dept: SURGERY | Facility: HOSPITAL | Age: 54
End: 2024-10-18
Payer: COMMERCIAL

## 2024-10-18 NOTE — ANESTHESIA PREPROCEDURE EVALUATION
10/18/2024  Pretty Kendall is a 54 y.o., female.  lapse Dry eyes   Raynaud phenomenon GERD (gastroesophageal reflux disease)   Abnormal Pap smear of cervix Autoimmune disorder     Surgical History     TONSILS REMOVED WISDOM TEETH EXTRACTED   CRYO OF CERVIX COLONOSCOPY     Substance History     Smoking Status: Never   Smokeless Tobacco Status: Never   Alcohol use: Yes, unspecified volume   Drug use: Never     Problem List   Current as of 10/18/24 1431  Abnormal uterine bleeding due to endometrial polyp   Excessive bleeding in premenopausal period   Intermenstrual bleeding       Pre-op Assessment    I have reviewed the Patient Summary Reports.     I have reviewed the Nursing Notes. I have reviewed the NPO Status.   I have reviewed the Medications.     Review of Systems  Anesthesia Hx:  No problems with previous Anesthesia             Denies Family Hx of Anesthesia complications.    Denies Personal Hx of Anesthesia complications.                    Social:  Non-Smoker       Hematology/Oncology:  Hematology Normal   Oncology Normal                                   EENT/Dental:  EENT/Dental Normal           Cardiovascular:  Exercise tolerance: good    Valvular problems/Murmurs, MVP              MVP  Cleared per Dr Pulliam couple weeks ago                           Pulmonary:  Pulmonary Normal                       Renal/:  Renal/ Normal                 Hepatic/GI:     GERD                Musculoskeletal:  Musculoskeletal Normal                Neurological:  Neurology Normal                                      Endocrine:  Endocrine Normal    RAYNAUD  Autoimmune disorder        Dermatological:  Skin Normal    Psych:  Psychiatric Normal                    Physical Exam  General: Well nourished, Cooperative, Alert and Oriented    Airway:  Mallampati: II / II  Mouth Opening: Normal  TM Distance: Normal  Tongue:  Normal  Neck ROM: Normal ROM    Dental:  Intact        Anesthesia Plan  Type of Anesthesia, risks & benefits discussed:    Anesthesia Type: Gen Supraglottic Airway  Intra-op Monitoring Plan: Standard ASA Monitors  Post Op Pain Control Plan: multimodal analgesia  Induction:  IV  Airway Plan: Direct  Informed Consent: Informed consent signed with the Patient and all parties understand the risks and agree with anesthesia plan.  All questions answered. Patient consented to blood products? Yes  ASA Score: 2  Day of Surgery Review of History & Physical: H&P Update referred to the surgeon/provider.I have interviewed and examined the patient. I have reviewed the patient's H&P dated: There are no significant changes.     Ready For Surgery From Anesthesia Perspective.     .

## 2024-10-21 ENCOUNTER — TELEPHONE (OUTPATIENT)
Dept: OBSTETRICS AND GYNECOLOGY | Facility: CLINIC | Age: 54
End: 2024-10-21
Payer: COMMERCIAL

## 2024-10-21 ENCOUNTER — ANESTHESIA (OUTPATIENT)
Dept: SURGERY | Facility: HOSPITAL | Age: 54
End: 2024-10-21
Payer: COMMERCIAL

## 2024-10-21 ENCOUNTER — HOSPITAL ENCOUNTER (OUTPATIENT)
Facility: HOSPITAL | Age: 54
Discharge: HOME | End: 2024-10-21
Attending: OBSTETRICS & GYNECOLOGY | Admitting: OBSTETRICS & GYNECOLOGY
Payer: COMMERCIAL

## 2024-10-21 VITALS
RESPIRATION RATE: 20 BRPM | HEIGHT: 66 IN | TEMPERATURE: 98 F | OXYGEN SATURATION: 100 % | WEIGHT: 175.94 LBS | SYSTOLIC BLOOD PRESSURE: 118 MMHG | DIASTOLIC BLOOD PRESSURE: 56 MMHG | HEART RATE: 61 BPM | BODY MASS INDEX: 28.27 KG/M2

## 2024-10-21 DIAGNOSIS — N92.6 IRREGULAR BLEEDING: ICD-10-CM

## 2024-10-21 DIAGNOSIS — N84.0 ABNORMAL UTERINE BLEEDING DUE TO ENDOMETRIAL POLYP: ICD-10-CM

## 2024-10-21 DIAGNOSIS — N92.4 EXCESSIVE BLEEDING IN PREMENOPAUSAL PERIOD: ICD-10-CM

## 2024-10-21 DIAGNOSIS — D25.9 UTERINE LEIOMYOMA, UNSPECIFIED LOCATION: ICD-10-CM

## 2024-10-21 DIAGNOSIS — R93.89 THICKENED ENDOMETRIUM: ICD-10-CM

## 2024-10-21 DIAGNOSIS — R30.9 URINARY PAIN: Primary | ICD-10-CM

## 2024-10-21 DIAGNOSIS — N92.3 INTERMENSTRUAL BLEEDING: Primary | ICD-10-CM

## 2024-10-21 DIAGNOSIS — N93.9 ABNORMAL UTERINE BLEEDING DUE TO ENDOMETRIAL POLYP: ICD-10-CM

## 2024-10-21 LAB
ABORH RETYPE: NORMAL
B-HCG UR QL: NEGATIVE
GROUP & RH: ABNORMAL
INDIRECT COOMBS: ABNORMAL
SPECIMEN OUTDATE: ABNORMAL

## 2024-10-21 PROCEDURE — 81025 URINE PREGNANCY TEST: CPT | Performed by: OBSTETRICS & GYNECOLOGY

## 2024-10-21 PROCEDURE — 86850 RBC ANTIBODY SCREEN: CPT | Performed by: OBSTETRICS & GYNECOLOGY

## 2024-10-21 PROCEDURE — 86870 RBC ANTIBODY IDENTIFICATION: CPT | Performed by: OBSTETRICS & GYNECOLOGY

## 2024-10-21 PROCEDURE — 27201423 OPTIME MED/SURG SUP & DEVICES STERILE SUPPLY: Performed by: OBSTETRICS & GYNECOLOGY

## 2024-10-21 PROCEDURE — 36000706: Performed by: OBSTETRICS & GYNECOLOGY

## 2024-10-21 PROCEDURE — 71000033 HC RECOVERY, INTIAL HOUR: Performed by: OBSTETRICS & GYNECOLOGY

## 2024-10-21 PROCEDURE — 71000016 HC POSTOP RECOV ADDL HR: Performed by: OBSTETRICS & GYNECOLOGY

## 2024-10-21 PROCEDURE — 63600175 PHARM REV CODE 636 W HCPCS: Performed by: OBSTETRICS & GYNECOLOGY

## 2024-10-21 PROCEDURE — 25000003 PHARM REV CODE 250: Performed by: OBSTETRICS & GYNECOLOGY

## 2024-10-21 PROCEDURE — 36415 COLL VENOUS BLD VENIPUNCTURE: CPT | Mod: 91 | Performed by: OBSTETRICS & GYNECOLOGY

## 2024-10-21 PROCEDURE — 86900 BLOOD TYPING SEROLOGIC ABO: CPT | Performed by: OBSTETRICS & GYNECOLOGY

## 2024-10-21 PROCEDURE — 63600175 PHARM REV CODE 636 W HCPCS: Performed by: NURSE ANESTHETIST, CERTIFIED REGISTERED

## 2024-10-21 PROCEDURE — 37000009 HC ANESTHESIA EA ADD 15 MINS: Performed by: OBSTETRICS & GYNECOLOGY

## 2024-10-21 PROCEDURE — 71000015 HC POSTOP RECOV 1ST HR: Performed by: OBSTETRICS & GYNECOLOGY

## 2024-10-21 PROCEDURE — 37000008 HC ANESTHESIA 1ST 15 MINUTES: Performed by: OBSTETRICS & GYNECOLOGY

## 2024-10-21 PROCEDURE — 36000707: Performed by: OBSTETRICS & GYNECOLOGY

## 2024-10-21 RX ORDER — KETOROLAC TROMETHAMINE 10 MG/1
10 TABLET, FILM COATED ORAL EVERY 6 HOURS PRN
Qty: 12 TABLET | Refills: 0 | Status: SHIPPED | OUTPATIENT
Start: 2024-10-21

## 2024-10-21 RX ORDER — LIDOCAINE HYDROCHLORIDE 20 MG/ML
INJECTION INTRAVENOUS
Status: DISCONTINUED | OUTPATIENT
Start: 2024-10-21 | End: 2024-10-21

## 2024-10-21 RX ORDER — GLYCOPYRROLATE 0.2 MG/ML
0.2 INJECTION INTRAMUSCULAR; INTRAVENOUS
Status: COMPLETED | OUTPATIENT
Start: 2024-10-21 | End: 2024-10-21

## 2024-10-21 RX ORDER — HYDROMORPHONE HYDROCHLORIDE 1 MG/ML
1 INJECTION, SOLUTION INTRAMUSCULAR; INTRAVENOUS; SUBCUTANEOUS EVERY 6 HOURS PRN
Status: DISCONTINUED | OUTPATIENT
Start: 2024-10-21 | End: 2024-10-21 | Stop reason: HOSPADM

## 2024-10-21 RX ORDER — ONDANSETRON 4 MG/1
8 TABLET, ORALLY DISINTEGRATING ORAL EVERY 8 HOURS PRN
Status: DISCONTINUED | OUTPATIENT
Start: 2024-10-21 | End: 2024-10-21 | Stop reason: HOSPADM

## 2024-10-21 RX ORDER — KETOROLAC TROMETHAMINE 30 MG/ML
INJECTION, SOLUTION INTRAMUSCULAR; INTRAVENOUS
Status: DISCONTINUED | OUTPATIENT
Start: 2024-10-21 | End: 2024-10-21

## 2024-10-21 RX ORDER — CIPROFLOXACIN 500 MG/1
500 TABLET ORAL EVERY 12 HOURS
Qty: 14 TABLET | Refills: 0 | Status: SHIPPED | OUTPATIENT
Start: 2024-10-21 | End: 2024-10-28

## 2024-10-21 RX ORDER — DIPHENHYDRAMINE HCL 25 MG
25 CAPSULE ORAL EVERY 4 HOURS PRN
Status: DISCONTINUED | OUTPATIENT
Start: 2024-10-21 | End: 2024-10-21 | Stop reason: HOSPADM

## 2024-10-21 RX ORDER — PROPOFOL 10 MG/ML
INJECTION, EMULSION INTRAVENOUS
Status: DISCONTINUED | OUTPATIENT
Start: 2024-10-21 | End: 2024-10-21

## 2024-10-21 RX ORDER — ONDANSETRON HYDROCHLORIDE 2 MG/ML
INJECTION, SOLUTION INTRAVENOUS
Status: DISCONTINUED | OUTPATIENT
Start: 2024-10-21 | End: 2024-10-21

## 2024-10-21 RX ORDER — ACETAMINOPHEN 10 MG/ML
INJECTION, SOLUTION INTRAVENOUS
Status: DISCONTINUED | OUTPATIENT
Start: 2024-10-21 | End: 2024-10-21

## 2024-10-21 RX ORDER — HYDROCODONE BITARTRATE AND ACETAMINOPHEN 5; 325 MG/1; MG/1
1 TABLET ORAL EVERY 4 HOURS PRN
Status: DISCONTINUED | OUTPATIENT
Start: 2024-10-21 | End: 2024-10-21 | Stop reason: HOSPADM

## 2024-10-21 RX ORDER — FAMOTIDINE 20 MG/1
20 TABLET, FILM COATED ORAL
Status: DISCONTINUED | OUTPATIENT
Start: 2024-10-21 | End: 2024-10-21

## 2024-10-21 RX ORDER — DEXAMETHASONE SODIUM PHOSPHATE 4 MG/ML
INJECTION, SOLUTION INTRA-ARTICULAR; INTRALESIONAL; INTRAMUSCULAR; INTRAVENOUS; SOFT TISSUE
Status: DISCONTINUED | OUTPATIENT
Start: 2024-10-21 | End: 2024-10-21

## 2024-10-21 RX ORDER — PHENAZOPYRIDINE HYDROCHLORIDE 200 MG/1
200 TABLET, FILM COATED ORAL 3 TIMES DAILY
Qty: 15 TABLET | Refills: 0 | Status: SHIPPED | OUTPATIENT
Start: 2024-10-21 | End: 2024-10-26

## 2024-10-21 RX ORDER — MIDAZOLAM HYDROCHLORIDE 1 MG/ML
2 INJECTION INTRAMUSCULAR; INTRAVENOUS ONCE
Status: COMPLETED | OUTPATIENT
Start: 2024-10-21 | End: 2024-10-21

## 2024-10-21 RX ORDER — DIPHENHYDRAMINE HYDROCHLORIDE 50 MG/ML
25 INJECTION INTRAMUSCULAR; INTRAVENOUS EVERY 4 HOURS PRN
Status: DISCONTINUED | OUTPATIENT
Start: 2024-10-21 | End: 2024-10-21 | Stop reason: HOSPADM

## 2024-10-21 RX ORDER — FAMOTIDINE 20 MG/1
20 TABLET, FILM COATED ORAL
Status: COMPLETED | OUTPATIENT
Start: 2024-10-21 | End: 2024-10-21

## 2024-10-21 RX ORDER — FENTANYL CITRATE 50 UG/ML
INJECTION, SOLUTION INTRAMUSCULAR; INTRAVENOUS
Status: DISCONTINUED | OUTPATIENT
Start: 2024-10-21 | End: 2024-10-21

## 2024-10-21 RX ORDER — CEFAZOLIN 2 G/1
2 INJECTION, POWDER, FOR SOLUTION INTRAMUSCULAR; INTRAVENOUS
Status: COMPLETED | OUTPATIENT
Start: 2024-10-21 | End: 2024-10-21

## 2024-10-21 RX ORDER — OXYCODONE AND ACETAMINOPHEN 10; 325 MG/1; MG/1
1 TABLET ORAL EVERY 4 HOURS PRN
Status: DISCONTINUED | OUTPATIENT
Start: 2024-10-21 | End: 2024-10-21 | Stop reason: HOSPADM

## 2024-10-21 RX ORDER — MUPIROCIN 20 MG/G
OINTMENT TOPICAL
Status: DISCONTINUED | OUTPATIENT
Start: 2024-10-21 | End: 2024-10-21 | Stop reason: HOSPADM

## 2024-10-21 RX ORDER — SODIUM CHLORIDE, SODIUM LACTATE, POTASSIUM CHLORIDE, CALCIUM CHLORIDE 600; 310; 30; 20 MG/100ML; MG/100ML; MG/100ML; MG/100ML
INJECTION, SOLUTION INTRAVENOUS CONTINUOUS
Status: DISCONTINUED | OUTPATIENT
Start: 2024-10-21 | End: 2024-10-21 | Stop reason: HOSPADM

## 2024-10-21 RX ADMIN — FAMOTIDINE 20 MG: 20 TABLET, FILM COATED ORAL at 06:10

## 2024-10-21 RX ADMIN — ONDANSETRON 4 MG: 2 INJECTION INTRAMUSCULAR; INTRAVENOUS at 08:10

## 2024-10-21 RX ADMIN — DEXAMETHASONE SODIUM PHOSPHATE 8 MG: 4 INJECTION, SOLUTION INTRA-ARTICULAR; INTRALESIONAL; INTRAMUSCULAR; INTRAVENOUS; SOFT TISSUE at 08:10

## 2024-10-21 RX ADMIN — FENTANYL CITRATE 100 MCG: 50 INJECTION, SOLUTION INTRAMUSCULAR; INTRAVENOUS at 07:10

## 2024-10-21 RX ADMIN — PROPOFOL 150 MG: 10 INJECTION, EMULSION INTRAVENOUS at 07:10

## 2024-10-21 RX ADMIN — KETOROLAC TROMETHAMINE 30 MG: 30 INJECTION, SOLUTION INTRAMUSCULAR; INTRAVENOUS at 08:10

## 2024-10-21 RX ADMIN — CEFAZOLIN 2 G: 2 INJECTION, POWDER, FOR SOLUTION INTRAMUSCULAR; INTRAVENOUS at 08:10

## 2024-10-21 RX ADMIN — LIDOCAINE HYDROCHLORIDE 50 MG: 20 INJECTION, SOLUTION INTRAVENOUS at 07:10

## 2024-10-21 RX ADMIN — ACETAMINOPHEN 1000 MG: 1000 INJECTION, SOLUTION INTRAVENOUS at 08:10

## 2024-10-21 RX ADMIN — MIDAZOLAM HYDROCHLORIDE 2 MG: 1 INJECTION, SOLUTION INTRAMUSCULAR; INTRAVENOUS at 07:10

## 2024-10-21 RX ADMIN — MUPIROCIN: 20 OINTMENT TOPICAL at 06:10

## 2024-10-21 RX ADMIN — FENTANYL CITRATE 100 MCG: 50 INJECTION, SOLUTION INTRAMUSCULAR; INTRAVENOUS at 08:10

## 2024-10-21 RX ADMIN — GLYCOPYRROLATE 0.2 MG: 0.2 INJECTION INTRAMUSCULAR; INTRAVENOUS at 06:10

## 2024-10-21 NOTE — TELEPHONE ENCOUNTER
Patient c/o burning upon urination . Dr. Brantley aware . New rx for cipro and pyridium sent to pharmacy

## 2024-10-21 NOTE — ANESTHESIA POSTPROCEDURE EVALUATION
Anesthesia Post Evaluation    Patient: Pretty Kendall    Procedure(s) Performed: Procedure(s) (LRB):  HYSTEROSCOPY, WITH DILATION AND CURETTAGE OF UTERUS (N/A)    Final Anesthesia Type: general      Patient location during evaluation: PACU  Patient participation: Yes- Able to Participate  Level of consciousness: awake and alert, awake and oriented  Post-procedure vital signs: reviewed and stable  Pain management: adequate  Airway patency: patent    PONV status at discharge: No PONV  Anesthetic complications: no      Cardiovascular status: blood pressure returned to baseline  Respiratory status: unassisted, room air and spontaneous ventilation  Hydration status: euvolemic  Follow-up not needed.              Vitals Value Taken Time   BP 92/44 10/21/24 0857   Temp 36.2 °C (97.2 °F) 10/21/24 0850   Pulse 57 10/21/24 0858   Resp 16 10/21/24 0850   SpO2 96 % 10/21/24 0858   Vitals shown include unfiled device data.      No case tracking events are documented in the log.      Pain/Petr Score: Petr Score: 8 (10/21/2024  8:50 AM)

## 2024-10-21 NOTE — ANESTHESIA PROCEDURE NOTES
Intubation    Date/Time: 10/21/2024 8:01 AM    Performed by: Pablo Miranda CRNA  Authorized by: Pablo Miranda CRNA    Intubation:     Induction:  Intravenous    Intubated:  Postinduction    Mask Ventilation:  Easy mask    Attempts:  1    Attempted By:  CRNA    Difficult Airway Encountered?: No      Complications:  None    Airway Device:  Supraglottic airway/LMA    Airway Device Size:  3.0    Style/Cuff Inflation:  Cuffed (inflated to minimal occlusive pressure)    Placement Verified By:  Capnometry    Complicating Factors:  None    Findings Post-Intubation:  BS equal bilateral

## 2024-11-01 LAB — BEAKER SEE SCANNED REPORT: NORMAL

## 2024-11-06 ENCOUNTER — TELEPHONE (OUTPATIENT)
Dept: OBSTETRICS AND GYNECOLOGY | Facility: CLINIC | Age: 54
End: 2024-11-06

## 2024-11-06 NOTE — TELEPHONE ENCOUNTER
----- Message from Allie sent at 11/6/2024  1:45 PM CST -----  Regarding: CALL BACK  Pt came in this afternoon and rescheduled appt due to Dr. Brantley in sx. She asked if she still has to wait another week to shower and have any sexual activity. Would like a nurse to call her back.

## 2024-11-06 NOTE — TELEPHONE ENCOUNTER
RETURNED PT. CALL.  CONFIRMED. PT. RESCHEDULED POST-OP APPOINT. TODAY DUE TO DR. KNIGHT IN EMERGENCY SURGERY.  ASKING IF SHE CAN TAKE BATH AND RESUME SEXUAL ACTIVITY. DR. KNIGHT NOTIFIED OF CONVERSATION. INST. PT. MAY RESUME SEXUAL ACTIVITY AND MAY TAKE A BATH AS LONG AS SHE IS NOT HAVING VAGINAL BLEEDING. PT. STATES IS NO LONGER HAVING VAGINAL BLEEDING.

## 2024-11-13 ENCOUNTER — OFFICE VISIT (OUTPATIENT)
Dept: OBSTETRICS AND GYNECOLOGY | Facility: CLINIC | Age: 54
End: 2024-11-13
Payer: COMMERCIAL

## 2024-11-13 VITALS — DIASTOLIC BLOOD PRESSURE: 70 MMHG | HEART RATE: 71 BPM | SYSTOLIC BLOOD PRESSURE: 108 MMHG

## 2024-11-13 DIAGNOSIS — Z09 POSTOP CHECK: Primary | ICD-10-CM

## 2024-11-13 PROCEDURE — 3074F SYST BP LT 130 MM HG: CPT | Mod: CPTII,,, | Performed by: NURSE PRACTITIONER

## 2024-11-13 PROCEDURE — 99024 POSTOP FOLLOW-UP VISIT: CPT | Mod: ,,, | Performed by: NURSE PRACTITIONER

## 2024-11-13 PROCEDURE — 1160F RVW MEDS BY RX/DR IN RCRD: CPT | Mod: CPTII,,, | Performed by: NURSE PRACTITIONER

## 2024-11-13 PROCEDURE — 3078F DIAST BP <80 MM HG: CPT | Mod: CPTII,,, | Performed by: NURSE PRACTITIONER

## 2024-11-13 PROCEDURE — 1159F MED LIST DOCD IN RCRD: CPT | Mod: CPTII,,, | Performed by: NURSE PRACTITIONER

## 2024-11-13 NOTE — PROGRESS NOTES
Subjective:    Pretty Kendall is a 54 y.o.  who presents to the clinic 3 weeks status post hysteroscopy, D&C. The patient is not having any pain. 2 week post op (Presents to clinic for post op, states she is not currently having pain and is doing well. Surgery was , hysteroscopy and D & C performed)    Past Medical History:   Diagnosis Date    Abnormal Pap smear of cervix     Autoimmune disorder     Dry eyes     GERD (gastroesophageal reflux disease)     Mitral valve prolapse     Raynaud phenomenon      Past Surgical History:   Procedure Laterality Date    COLONOSCOPY      CRYO OF CERVIX      HYSTEROSCOPY WITH DILATION AND CURETTAGE OF UTERUS N/A 10/21/2024    Procedure: HYSTEROSCOPY, WITH DILATION AND CURETTAGE OF UTERUS;  Surgeon: Marlon Brantley MD;  Location: [facility] OR;  Service: OB/GYN;  Laterality: N/A;    TONSILS REMOVED      WISDOM TEETH EXTRACTED       Review of patient's allergies indicates:  No Known Allergies  OB History    Para Term  AB Living   2 2 1 1   2   SAB IAB Ectopic Multiple Live Births           2      # Outcome Date GA Lbr Christiano/2nd Weight Sex Type Anes PTL Lv   2  01    F Vag-Spont   COLEMAN   1 Term 97    F Vag-Spont   COLEMAN     Social History     Tobacco Use    Smoking status: Never    Smokeless tobacco: Never   Substance Use Topics    Alcohol use: Yes     Comment: RARE    Drug use: Never     Family History   Problem Relation Name Age of Onset    Heart disease Paternal Grandfather      Heart disease Paternal Grandmother      Heart disease Maternal Grandmother      Heart disease Maternal Grandfather      Cancer Father      Heart disease Father      Heart disease Mother         Current Outpatient Medications:     diltiaZEM (CARDIZEM CD) 180 MG 24 hr capsule, Take 180 mg by mouth., Disp: , Rfl:     nebivoloL (BYSTOLIC) 10 MG Tab, Take 10 mg by mouth., Disp: , Rfl:     ketorolac (TORADOL) 10 mg tablet, Take 1 tablet (10 mg total) by mouth every 6  (six) hours as needed for Pain. (Patient not taking: Reported on 11/13/2024), Disp: 12 tablet, Rfl: 0    norethindrone (AYGESTIN) 5 mg Tab, Take 1 tablet (5 mg total) by mouth once daily. (Patient not taking: Reported on 11/13/2024), Disp: 30 tablet, Rfl: 11    A comprehensive review of symptoms was completed and negative except as noted above.  Objective:   /70 (BP Location: Right arm, Patient Position: Sitting)   Pulse 71   LMP 11/12/2024 (Exact Date)   General Appearance: alert, in no acute distress, normal, well nourished.  Breast:  Right: Inspection/palpation: no discharge, no masses present, no nipple retraction, no skin changes, no skin dimpling, no tenderness, no lymphadenopathy, no axillary mass, no axillary tenderness.  Left: Inspection/palpation: no discharge, no masses present, no nipple retraction, no skin changes, no skin dimpling, no tenderness, no lymphadenopathy, no axillary mass, no axillary tenderness.  Gastrointestinal:  Abdomen: no masses. no tender, nondistended.  Liver and spleen: normal  Hernias: no hernias present, no inguinal adenopathy.  Wound Site: clean, dry and intact without erythema or induration.   Assessment:   Postop check         No results found for this or any previous visit (from the past 24 hours).    Plan:   Continue any current medications.  Wound care discussed.  Follow up No follow-ups on file. In addition to her scheduled follow up, the pt has also been instructed to follow up on as needed basis.

## 2025-05-20 ENCOUNTER — LAB VISIT (OUTPATIENT)
Dept: LAB | Facility: HOSPITAL | Age: 55
End: 2025-05-20
Attending: OPHTHALMOLOGY
Payer: COMMERCIAL

## 2025-05-20 DIAGNOSIS — H33.022 RETINAL DETACHMENT OF LEFT EYE WITH MULTIPLE BREAKS: ICD-10-CM

## 2025-05-20 DIAGNOSIS — H33.022 RETINAL DETACHMENT OF LEFT EYE WITH MULTIPLE BREAKS: Primary | ICD-10-CM

## 2025-05-20 DIAGNOSIS — Z01.818 OTHER SPECIFIED PRE-OPERATIVE EXAMINATION: Primary | ICD-10-CM

## 2025-05-20 LAB
ANION GAP SERPL CALC-SCNC: 10 MEQ/L
BUN SERPL-MCNC: 16.4 MG/DL (ref 9.8–20.1)
CALCIUM SERPL-MCNC: 9.4 MG/DL (ref 8.4–10.2)
CHLORIDE SERPL-SCNC: 105 MMOL/L (ref 98–107)
CO2 SERPL-SCNC: 26 MMOL/L (ref 22–29)
CREAT SERPL-MCNC: 0.65 MG/DL (ref 0.55–1.02)
CREAT/UREA NIT SERPL: 25
GFR SERPLBLD CREATININE-BSD FMLA CKD-EPI: >60 ML/MIN/1.73/M2
GLUCOSE SERPL-MCNC: 83 MG/DL (ref 74–100)
POTASSIUM SERPL-SCNC: 4.6 MMOL/L (ref 3.5–5.1)
SODIUM SERPL-SCNC: 141 MMOL/L (ref 136–145)

## 2025-05-20 PROCEDURE — 80048 BASIC METABOLIC PNL TOTAL CA: CPT

## 2025-05-20 PROCEDURE — 36415 COLL VENOUS BLD VENIPUNCTURE: CPT

## 2025-05-22 ENCOUNTER — ANESTHESIA EVENT (OUTPATIENT)
Facility: HOSPITAL | Age: 55
End: 2025-05-22
Payer: COMMERCIAL

## 2025-05-22 ENCOUNTER — ANESTHESIA (OUTPATIENT)
Facility: HOSPITAL | Age: 55
End: 2025-05-22
Payer: COMMERCIAL

## 2025-05-22 ENCOUNTER — HOSPITAL ENCOUNTER (OUTPATIENT)
Facility: HOSPITAL | Age: 55
Discharge: HOME | End: 2025-05-22
Attending: OPHTHALMOLOGY | Admitting: OPHTHALMOLOGY
Payer: COMMERCIAL

## 2025-05-22 VITALS
WEIGHT: 177 LBS | DIASTOLIC BLOOD PRESSURE: 52 MMHG | HEIGHT: 66 IN | OXYGEN SATURATION: 97 % | HEART RATE: 65 BPM | SYSTOLIC BLOOD PRESSURE: 130 MMHG | BODY MASS INDEX: 28.45 KG/M2 | TEMPERATURE: 97 F | RESPIRATION RATE: 16 BRPM

## 2025-05-22 DIAGNOSIS — H33.22 DETACHED RETINA, LEFT: ICD-10-CM

## 2025-05-22 LAB
B-HCG UR QL: NEGATIVE
CTP QC/QA: YES

## 2025-05-22 PROCEDURE — 25000003 PHARM REV CODE 250: Performed by: OPHTHALMOLOGY

## 2025-05-22 PROCEDURE — 25000003 PHARM REV CODE 250

## 2025-05-22 PROCEDURE — 36000708 HC OR TIME LEV III 1ST 15 MIN: Performed by: OPHTHALMOLOGY

## 2025-05-22 PROCEDURE — 37000008 HC ANESTHESIA 1ST 15 MINUTES: Performed by: OPHTHALMOLOGY

## 2025-05-22 PROCEDURE — 71000033 HC RECOVERY, INTIAL HOUR: Performed by: OPHTHALMOLOGY

## 2025-05-22 PROCEDURE — 37000009 HC ANESTHESIA EA ADD 15 MINS: Performed by: OPHTHALMOLOGY

## 2025-05-22 PROCEDURE — 71000015 HC POSTOP RECOV 1ST HR: Performed by: OPHTHALMOLOGY

## 2025-05-22 PROCEDURE — 71000016 HC POSTOP RECOV ADDL HR: Performed by: OPHTHALMOLOGY

## 2025-05-22 PROCEDURE — 63600175 PHARM REV CODE 636 W HCPCS: Performed by: NURSE ANESTHETIST, CERTIFIED REGISTERED

## 2025-05-22 PROCEDURE — 36000709 HC OR TIME LEV III EA ADD 15 MIN: Performed by: OPHTHALMOLOGY

## 2025-05-22 RX ORDER — HALOPERIDOL LACTATE 5 MG/ML
0.5 INJECTION, SOLUTION INTRAMUSCULAR EVERY 10 MIN PRN
OUTPATIENT
Start: 2025-05-22

## 2025-05-22 RX ORDER — PHENYLEPHRINE HYDROCHLORIDE 25 MG/ML
1 SOLUTION/ DROPS OPHTHALMIC
Status: COMPLETED | OUTPATIENT
Start: 2025-05-22 | End: 2025-05-22

## 2025-05-22 RX ORDER — PROPOFOL 10 MG/ML
VIAL (ML) INTRAVENOUS
Status: DISCONTINUED | OUTPATIENT
Start: 2025-05-22 | End: 2025-05-22

## 2025-05-22 RX ORDER — LIDOCAINE HYDROCHLORIDE 10 MG/ML
INJECTION, SOLUTION EPIDURAL; INFILTRATION; INTRACAUDAL; PERINEURAL
Status: DISCONTINUED | OUTPATIENT
Start: 2025-05-22 | End: 2025-05-22

## 2025-05-22 RX ORDER — HYDROMORPHONE HYDROCHLORIDE 2 MG/ML
0.2 INJECTION, SOLUTION INTRAMUSCULAR; INTRAVENOUS; SUBCUTANEOUS EVERY 5 MIN PRN
Refills: 0 | OUTPATIENT
Start: 2025-05-22

## 2025-05-22 RX ORDER — GLUCAGON 1 MG
1 KIT INJECTION
OUTPATIENT
Start: 2025-05-22

## 2025-05-22 RX ORDER — LIDOCAINE HYDROCHLORIDE 20 MG/ML
INJECTION, SOLUTION EPIDURAL; INFILTRATION; INTRACAUDAL; PERINEURAL
Status: DISCONTINUED
Start: 2025-05-22 | End: 2025-05-22 | Stop reason: WASHOUT

## 2025-05-22 RX ORDER — TOBRAMYCIN AND DEXAMETHASONE 3; 1 MG/ML; MG/ML
SUSPENSION/ DROPS OPHTHALMIC
Status: DISCONTINUED
Start: 2025-05-22 | End: 2025-05-22 | Stop reason: HOSPADM

## 2025-05-22 RX ORDER — ONDANSETRON HYDROCHLORIDE 2 MG/ML
INJECTION, SOLUTION INTRAMUSCULAR; INTRAVENOUS
Status: DISCONTINUED | OUTPATIENT
Start: 2025-05-22 | End: 2025-05-22

## 2025-05-22 RX ORDER — TROPICAMIDE 10 MG/ML
1 SOLUTION/ DROPS OPHTHALMIC
Status: COMPLETED | OUTPATIENT
Start: 2025-05-22 | End: 2025-05-22

## 2025-05-22 RX ORDER — CYCLOPENTOLATE HYDROCHLORIDE 10 MG/ML
1 SOLUTION/ DROPS OPHTHALMIC
Status: COMPLETED | OUTPATIENT
Start: 2025-05-22 | End: 2025-05-22

## 2025-05-22 RX ORDER — DEXAMETHASONE SODIUM PHOSPHATE 4 MG/ML
INJECTION, SOLUTION INTRA-ARTICULAR; INTRALESIONAL; INTRAMUSCULAR; INTRAVENOUS; SOFT TISSUE
Status: DISCONTINUED | OUTPATIENT
Start: 2025-05-22 | End: 2025-05-22

## 2025-05-22 RX ORDER — POVIDONE-IODINE 5 %
SOLUTION, NON-ORAL OPHTHALMIC (EYE)
Status: DISCONTINUED
Start: 2025-05-22 | End: 2025-05-22 | Stop reason: HOSPADM

## 2025-05-22 RX ORDER — KETOROLAC TROMETHAMINE 30 MG/ML
INJECTION, SOLUTION INTRAMUSCULAR; INTRAVENOUS
Status: DISCONTINUED | OUTPATIENT
Start: 2025-05-22 | End: 2025-05-22

## 2025-05-22 RX ORDER — HYDRALAZINE HYDROCHLORIDE 20 MG/ML
10 INJECTION INTRAMUSCULAR; INTRAVENOUS ONCE
OUTPATIENT
Start: 2025-05-22 | End: 2025-05-22

## 2025-05-22 RX ORDER — TOBRAMYCIN AND DEXAMETHASONE 3; 1 MG/ML; MG/ML
SUSPENSION/ DROPS OPHTHALMIC
Status: DISCONTINUED | OUTPATIENT
Start: 2025-05-22 | End: 2025-05-22 | Stop reason: HOSPADM

## 2025-05-22 RX ORDER — KETOROLAC TROMETHAMINE 30 MG/ML
15 INJECTION, SOLUTION INTRAMUSCULAR; INTRAVENOUS EVERY 8 HOURS PRN
OUTPATIENT
Start: 2025-05-22 | End: 2025-05-24

## 2025-05-22 RX ORDER — BUPIVACAINE HYDROCHLORIDE 5 MG/ML
INJECTION, SOLUTION EPIDURAL; INTRACAUDAL; PERINEURAL
Status: DISCONTINUED
Start: 2025-05-22 | End: 2025-05-22 | Stop reason: WASHOUT

## 2025-05-22 RX ORDER — FENTANYL CITRATE 50 UG/ML
INJECTION, SOLUTION INTRAMUSCULAR; INTRAVENOUS
Status: DISCONTINUED | OUTPATIENT
Start: 2025-05-22 | End: 2025-05-22

## 2025-05-22 RX ADMIN — CYCLOPENTOLATE HYDROCHLORIDE 1 DROP: 10 SOLUTION/ DROPS OPHTHALMIC at 06:05

## 2025-05-22 RX ADMIN — LIDOCAINE HYDROCHLORIDE 50 MG: 10 INJECTION, SOLUTION EPIDURAL; INFILTRATION; INTRACAUDAL; PERINEURAL at 07:05

## 2025-05-22 RX ADMIN — KETOROLAC TROMETHAMINE 30 MG: 30 INJECTION, SOLUTION INTRAMUSCULAR; INTRAVENOUS at 07:05

## 2025-05-22 RX ADMIN — PHENYLEPHRINE HYDROCHLORIDE 1 DROP: 25 SOLUTION/ DROPS OPHTHALMIC at 06:05

## 2025-05-22 RX ADMIN — FENTANYL CITRATE 50 MCG: 50 INJECTION, SOLUTION INTRAMUSCULAR; INTRAVENOUS at 07:05

## 2025-05-22 RX ADMIN — ONDANSETRON HYDROCHLORIDE 4 MG: 2 SOLUTION INTRAMUSCULAR; INTRAVENOUS at 07:05

## 2025-05-22 RX ADMIN — PROPOFOL 200 MG: 10 INJECTION, EMULSION INTRAVENOUS at 07:05

## 2025-05-22 RX ADMIN — TROPICAMIDE 1 DROP: 10 SOLUTION/ DROPS OPHTHALMIC at 06:05

## 2025-05-22 RX ADMIN — DEXAMETHASONE SODIUM PHOSPHATE 8 MG: 4 INJECTION, SOLUTION INTRA-ARTICULAR; INTRALESIONAL; INTRAMUSCULAR; INTRAVENOUS; SOFT TISSUE at 07:05

## 2025-05-22 RX ADMIN — SODIUM CHLORIDE, POTASSIUM CHLORIDE, SODIUM LACTATE AND CALCIUM CHLORIDE: 600; 310; 30; 20 INJECTION, SOLUTION INTRAVENOUS at 07:05

## 2025-05-22 NOTE — OP NOTE
[facility]                         [address] [address]     PATIENT NAME: Pretty Kendall   YOB: 1970   CSN: [id number]        MRN: [id number]   ADMIT DATE: 5/22/2025   PHYSICIAN:         Cash Hidalgo MD                          OPERATIVE REPORT        DATE OF SURGERY:05/22/2025       SURGEON:  Cash Hidalgo MD     PREOPERATIVE DIAGNOSIS:  Retinal Detachment Left Eye     POSTOPERATIVE DIAGNOSIS:  Retinal Detachment Left Eye    PROCEDURE:  Pneumatic Retinopexy of the left eye     ANESTHESIA:  General.     ESTIMATED BLOOD LOSS:  Less than 5 cc.     COMPLICATIONS:  None.     PROCEDURE INDICATIONS: Pretty Kendall  has a history of a Retinal Detachment Left Eye.     PROCEDURE DESCRIPTION:  The patient was taken to the operative theater where   general anesthesia was begun.  The right eye was prepped and draped in the   normal sterile fashion and a lid speculum applied. Cryoretinopexy was used to treat the retinal break. A tuberculin syringe   With a 30 g needle was used to create a corneal paracentesis and 0.3 cc of aqueous fluid was withdrawn. This was followed  by an intravitreal injection of 0.3 cc of pure C3F8 gas. This was done with a 3 cc syringe and a 30 g needle through the inferior pars plana  3.5 mm from the surgical limbus.   The post injection IOP was 15 mmHg and the central retinal artery was well perfused.   Several drops of Tobradex Ophthalmic Solution were applied. The   lid speculum and eye drape were then removed, and the eye was covered with a   gauze patch and a Guzmán shield.  The patient was then transported to the   postoperative care unit for recovery.    DISCHARGE CONDITION:  Good.     DISPOSITION:  Home with followup with Dr. Hidalgo the following day.  This patient   tolerated the procedure well.    Pretty Kendall is discharged to home.

## 2025-05-22 NOTE — DISCHARGE INSTRUCTIONS
Retinal Surgery  Care After    Refer to this sheet in the next few weeks. These instructions provide you with information on caring for yourself after your procedure. Your caregiver may also give you more specific instructions. Your treatment has been planned according to current medical practices, but problems sometimes occur. Call your caregiver if you have any problems or questions after your procedure.    Home Care Instructions  If you are instructed to stay in a certain position for a certain amount of time, it is important to do so. Positions may include sitting up or lying on either side. The reasons for certain positioning depends on the reason you had the surgery and the type of vitrectomy performed. Ask your caregiver if you have to remain in a certain position for a certain amount of time.    Wear your eye patch for the first day. Dr. Hidalgo will remove the patch the next morning after surgery.   Keep the area around your eye clean and dry.  Wear the plastic shield over your eye when you sleep for the first day. This is to protect the eye from being accidentally bumped or jabbed, or having too much pressure put on it.  Avoid any strenuous physical activity for as long as directed by your caregiver. This includes bending over, lifting anything over 5 pounds (2.3 kg), or straining. Talk to your caregiver about when it may be safe to resume sexual activity.  If any of your regular medicines were stopped before surgery, carefully follow your caregiver's instructions about which medicines to restart and when to restart them. You may be instructed not to use blood thinners or aspirin.  Continue with your normal diet unless directed otherwise by your caregiver. If you are diabetic, stay on your diabetic diet, or use your insulin as directed by your caregiver.  It is okay to use your other eye for reading and watching television.  Do not drive a car or use contact lenses until your caregiver says it is okay.   "    Dr. Hidalgo will remove patch the morning after surgery and will begin the following medication as instructed.    Medications:  Tobradex- 1 drop in operative eye, 4 times a day. **Dr. Hidalgo will provide these eye drops at your follow up appointment.      After surgery instructions:  Maintain the following positional restrictions until cleared by your surgeon: *****  right side 45 degrees      Avoid any heavy lifting, strenuous activity, or driving until cleared to do so by your surgeon.  Avoid any "dirty" activities which may lead to eye infection, such as gardening or scrubbing floors.  Do not rub the operative eye.    Notify your surgeon of any:  signs of bleeding or infection  excessive pain or nausea that is not relieved with medication  worsening or sudden loss of vision    SEEK MEDICAL CARE IF:  Your eye becomes very red or painful.  You develop any pus or discharge from the eye.  You have chills.  Your eyelids on either eye become swollen or stuck shut.    SEEK IMMEDIATE MEDICAL CARE IF:    You have a fever.    You notice a change in vision in either eye.    You see more floaters or spots in front of your vision.    Part of your side vision is black and you cannot see through it. It may feel like a shade is being pulled toward the center of your vision from any direction-Leave patch on tonight. Keep it clean and dry.  "

## 2025-05-22 NOTE — ANESTHESIA PROCEDURE NOTES
Intubation    Date/Time: 5/22/2025 7:19 AM    Performed by: Shirlene Connell CRNA  Authorized by: Anthony Melendez MD    Intubation:     Induction:  Intravenous    Intubated:  Postinduction    Mask Ventilation:  Easy mask    Attempts:  1    Attempted By:  CRNA    Difficult Airway Encountered?: No      Complications:  None    Airway Device:  Supraglottic airway/LMA    Airway Device Size:  4.0    Style/Cuff Inflation:  Cuffed (inflated to minimal occlusive pressure)    Secured at:  The lips    Placement Verified By:  Capnometry    Complicating Factors:  None    Findings Post-Intubation:  BS equal bilateral and atraumatic/condition of teeth unchanged

## 2025-05-22 NOTE — DISCHARGE SUMMARY
[facility]                         [address] [address]     PATIENT NAME: Pretty Kendall   YOB: 1970   CSN: [id number]        MRN: [id number]   ADMIT DATE: 5/22/2025   PHYSICIAN:         Cash Hidalgo MD  Discharge Date: 5/22/2025  Discharge Note        Pretty Kendall had eye surgery with Cash Hidalgo on 5/22/2025. Pretty Kendall tolerated the procedure will and is discharged in good condition. Pretty Kendall is discharged to home with follow up with Dr. Hidalgo the following day.

## 2025-05-22 NOTE — ANESTHESIA POSTPROCEDURE EVALUATION
Anesthesia Post Evaluation    Patient: Pretty [place]    Procedure(s) Performed: Procedure(s) (LRB):  RETINOPEXY Pheumatic OS (Left)    Final Anesthesia Type: general      Patient location during evaluation: PACU  Patient participation: Yes- Able to Participate  Level of consciousness: awake and alert and oriented  Post-procedure vital signs: reviewed and stable  Pain management: adequate  Airway patency: patent    PONV status at discharge: No PONV  Anesthetic complications: no      Cardiovascular status: hemodynamically stable  Respiratory status: unassisted, spontaneous ventilation and room air  Hydration status: euvolemic  Follow-up not needed.              Vitals Value Taken Time   /52 05/22/25 09:15   Temp 36.1 °C (97 °F) 05/22/25 07:50   Pulse 65 05/22/25 09:09   Resp 16 05/22/25 09:00   SpO2 97 % 05/22/25 09:09   Vitals shown include unfiled device data.      Event Time   Out of Recovery 08:20:00         Pain/Petr Score: Petr Score: 10 (5/22/2025  8:20 AM)

## 2025-05-22 NOTE — TRANSFER OF CARE
"Anesthesia Transfer of Care Note    Patient: Pretty Kendall    Procedure(s) Performed: Procedure(s) (LRB):  RETINOPEXY Pheumatic OS (Left)    Patient location: PACU    Anesthesia Type: general    Transport from OR: Transported from OR on room air with adequate spontaneous ventilation    Post pain: adequate analgesia    Post assessment: no apparent anesthetic complications    Post vital signs: stable    Level of consciousness: responds to stimulation    Nausea/Vomiting: no nausea/vomiting    Complications: none    Transfer of care protocol was followed      Last vitals: Visit Vitals  /81   Pulse 69   Temp 36.8 °C (98.2 °F) (Oral)   Ht 5' 6" (1.676 m)   Wt 80.3 kg (177 lb)   LMP 04/29/2025 (Exact Date)   SpO2 100%   Breastfeeding No   BMI 28.57 kg/m²     "

## 2025-05-22 NOTE — ANESTHESIA PREPROCEDURE EVALUATION
"                                                                                                             05/22/2025  Pretty Kendall is a 55 y.o., female in good health who presents to [facility] today for retinopexy with Dr. Hidalgo. She has MVP, which in the past has been implicated as the cause of some PVCs that she was having. But since starting Bystolic and Cardizem she has not had any more issues with this. She took those medications this morning.     Height: 5' 6" (1.676 m) (05/21/25)   Weight: 80.3 kg (177 lb) (05/21/25)   BMI: 28.6 (05/21/25)   NPO Status: 1900   Allergies: MILK CONTAINING PRODUCTS (DAIRY), ACETAMINOPHEN     Pre Vitals  Current as of 05/22/25 0624  BP: 136/81 Pulse: 69   Resp: SpO2: 100   Temp: 36.8 °C (98.2 °F)     Past Medical History   Mitral valve prolapse Dry eyes   Raynaud phenomenon GERD (gastroesophageal reflux disease)   Abnormal Pap smear of cervix Autoimmune disorder     Surgical History    TONSILS REMOVED WISDOM TEETH EXTRACTED   CRYO OF CERVIX COLONOSCOPY   HYSTEROSCOPY WITH DILATION AND CURETTAGE OF UTERUS      Prescription Medications  Within last 14 days from 05/22/25   Last Taken Last Updated   diltiaZEM (CARDIZEM CD) 180 MG 24 hr capsule    5/22/2025 at Morning 05/22/25 0609   ketorolac (TORADOL) 10 mg tablet        nebivoloL (BYSTOLIC) 10 MG Tab    5/22/2025 at Morning 05/22/25 0609   norethindrone (AYGESTIN) 5 mg Tab    10/20/2024 10/21/24 0633         Latest Reference Range & Units 05/20/25 10:02   Sodium 136 - 145 mmol/L 141   Potassium 3.5 - 5.1 mmol/L 4.6   Chloride 98 - 107 mmol/L 105   CO2 22 - 29 mmol/L 26   Anion Gap mEq/L 10.0   BUN 9.8 - 20.1 mg/dL 16.4   Creatinine 0.55 - 1.02 mg/dL 0.65   BUN/CREAT RATIO  25   eGFR mL/min/1.73/m2 >60   Glucose 74 - 100 mg/dL 83   Calcium 8.4 - 10.2 mg/dL 9.4   UPT Negative 5/22/25   EKG 5/20/25   Normal sinus rhythm   Possible Anterior infarct ,age undetermined     Pre-op Assessment    I have reviewed the Patient Summary Reports.   "   I have reviewed the Nursing Notes. I have reviewed the NPO Status.   I have reviewed the Medications.     Review of Systems  Anesthesia Hx:  No problems with previous Anesthesia   History of prior surgery of interest to airway management or planning:  Previous anesthesia: General, MAC        Denies Family Hx of Anesthesia complications.    Denies Personal Hx of Anesthesia complications.                    Social:  Non-Smoker, Social Alcohol Use       Hematology/Oncology:    Oncology Normal    -- Denies Anemia:                                  EENT/Dental:  EENT/Dental Normal           Cardiovascular:  Exercise tolerance: good    Denies Hypertension.       Denies Angina.        ECG has been reviewed.                            Pulmonary:     Denies Asthma.   Denies Shortness of breath.   Denies Sleep Apnea.                Renal/:   Denies Chronic Renal Disease.                Hepatic/GI:     GERD, well controlled    Not Taking GLP-1 Agonists     Gerd          Musculoskeletal:  Musculoskeletal Normal                Neurological:    Denies CVA.                                    Endocrine:  Denies Diabetes.         Denies Obesity / BMI > 30  Dermatological:  Skin Normal    Psych:  Psychiatric Normal                    Physical Exam  General: Well nourished, Cooperative, Alert and Oriented    Airway:  Mallampati: II / I  Mouth Opening: Normal  TM Distance: Normal  Tongue: Normal  Neck ROM: Normal ROM    Dental:  Intact    Chest/Lungs:  Clear to auscultation, Normal Respiratory Rate    Heart:  Rate: Normal  Rhythm: Regular Rhythm  Sounds: Normal    Abdomen:  Normal, Soft, Nontender        Anesthesia Plan  Type of Anesthesia, risks & benefits discussed:    Anesthesia Type: Gen ETT  Intra-op Monitoring Plan: Standard ASA Monitors  Post Op Pain Control Plan: IV/PO Opioids PRN and multimodal analgesia  Induction:  IV  Airway Plan: Direct, Post-Induction  Informed Consent: Informed consent signed with the Patient and all  parties understand the risks and agree with anesthesia plan.  All questions answered.   ASA Score: 2  Day of Surgery Review of History & Physical: H&P Update referred to the surgeon/provider.    Ready For Surgery From Anesthesia Perspective.     .

## (undated) DEVICE — TRAY DRY SKIN SCRUB PREP

## (undated) DEVICE — DRAPE UND BUTT W/POUCH 40X44IN

## (undated) DEVICE — TUBING INFLOW HYSTEROSCOPY

## (undated) DEVICE — ELECTRODE REM POLYHESIVE II

## (undated) DEVICE — GLOVE 8 PROTEXIS PI BLUE

## (undated) DEVICE — GLOVE SENSICARE PI GRN 8

## (undated) DEVICE — PACK ECLIPSE BASIC III SURG

## (undated) DEVICE — SPONGE COTTON TRAY 4X4IN

## (undated) DEVICE — DRESSING TELFA N ADH 3X8

## (undated) DEVICE — TOWEL OR DISP STRL BLUE 4/PK

## (undated) DEVICE — PAD EYE STERILE 1-5/8X2-5/8IN

## (undated) DEVICE — PAD CURITY MATERNITY PERI

## (undated) DEVICE — CATH URETHRAL RED 16FR

## (undated) DEVICE — SYR TB DETACH NDL ST 25G 5/8IN

## (undated) DEVICE — TUBE SUC STR CONN .281IN 10FT

## (undated) DEVICE — SWABSTICK PVP-1 SINGLE 10%

## (undated) DEVICE — KIT MAJOR SINGLE BASIN

## (undated) DEVICE — SET EXT MALE LL 34IN

## (undated) DEVICE — GOWN SMARTGOWN 3XL XLONG

## (undated) DEVICE — POSITIONER HEAD ADULT

## (undated) DEVICE — SET ADMIN IV  CLRLNK 3 PORT

## (undated) DEVICE — NDL HYPO STD REG BVL 30G 0.5IN

## (undated) DEVICE — JELLY SURGILUBE LUBE PKT 3GM

## (undated) DEVICE — APPLICATOR COT TIP WD STRL 3IN

## (undated) DEVICE — GAS ISPAN C3F8 20GM

## (undated) DEVICE — SOL NACL IRR 1000ML BTL

## (undated) DEVICE — STOPCOCK IV 4 WAY LG BOR ROT M

## (undated) DEVICE — SUPPORT ULNA NERVE PROTECTOR

## (undated) DEVICE — SYR LUER LOCK 1CC

## (undated) DEVICE — FILTER .2MCRN 45CC STRL DISP

## (undated) DEVICE — SCRUB HIBICLENS 4% CHG 4OZ

## (undated) DEVICE — SYR 3ML LL 18GA 1.5IN